# Patient Record
Sex: MALE | Race: WHITE | NOT HISPANIC OR LATINO | Employment: UNEMPLOYED | URBAN - METROPOLITAN AREA
[De-identification: names, ages, dates, MRNs, and addresses within clinical notes are randomized per-mention and may not be internally consistent; named-entity substitution may affect disease eponyms.]

---

## 2018-02-22 ENCOUNTER — OFFICE VISIT CONVERTED (OUTPATIENT)
Dept: ORTHOPEDIC SURGERY | Facility: CLINIC | Age: 38
End: 2018-02-22
Attending: PHYSICIAN ASSISTANT

## 2018-03-16 ENCOUNTER — OFFICE VISIT CONVERTED (OUTPATIENT)
Dept: ORTHOPEDIC SURGERY | Facility: CLINIC | Age: 38
End: 2018-03-16
Attending: PHYSICIAN ASSISTANT

## 2018-04-03 ENCOUNTER — OFFICE VISIT CONVERTED (OUTPATIENT)
Dept: ORTHOPEDIC SURGERY | Facility: CLINIC | Age: 38
End: 2018-04-03
Attending: ORTHOPAEDIC SURGERY

## 2018-05-01 ENCOUNTER — OFFICE VISIT CONVERTED (OUTPATIENT)
Dept: ORTHOPEDIC SURGERY | Facility: CLINIC | Age: 38
End: 2018-05-01
Attending: PHYSICIAN ASSISTANT

## 2018-06-12 ENCOUNTER — OFFICE VISIT CONVERTED (OUTPATIENT)
Dept: ORTHOPEDIC SURGERY | Facility: CLINIC | Age: 38
End: 2018-06-12
Attending: ORTHOPAEDIC SURGERY

## 2021-05-16 VITALS — RESPIRATION RATE: 18 BRPM | HEART RATE: 80 BPM | OXYGEN SATURATION: 99 %

## 2021-05-16 VITALS — HEART RATE: 72 BPM | RESPIRATION RATE: 16 BRPM | OXYGEN SATURATION: 98 %

## 2021-05-16 VITALS — HEART RATE: 82 BPM | OXYGEN SATURATION: 99 % | RESPIRATION RATE: 18 BRPM

## 2021-05-16 VITALS — OXYGEN SATURATION: 98 % | RESPIRATION RATE: 18 BRPM | HEART RATE: 80 BPM

## 2022-09-19 ENCOUNTER — HOSPITAL ENCOUNTER (EMERGENCY)
Facility: HOSPITAL | Age: 42
Discharge: HOME | End: 2022-09-19
Attending: EMERGENCY MEDICINE | Admitting: EMERGENCY MEDICINE

## 2022-09-19 ENCOUNTER — APPOINTMENT (OUTPATIENT)
Dept: GENERAL RADIOLOGY | Facility: HOSPITAL | Age: 42
End: 2022-09-19

## 2022-09-19 VITALS
RESPIRATION RATE: 18 BRPM | HEART RATE: 102 BPM | SYSTOLIC BLOOD PRESSURE: 144 MMHG | HEIGHT: 68 IN | TEMPERATURE: 98 F | WEIGHT: 225.97 LBS | BODY MASS INDEX: 34.25 KG/M2 | DIASTOLIC BLOOD PRESSURE: 104 MMHG | OXYGEN SATURATION: 99 %

## 2022-09-19 DIAGNOSIS — M79.601 RIGHT ARM PAIN: ICD-10-CM

## 2022-09-19 DIAGNOSIS — S46.211A STRAIN OF RIGHT BICEPS, INITIAL ENCOUNTER: Primary | ICD-10-CM

## 2022-09-19 PROCEDURE — 25010000002 KETOROLAC TROMETHAMINE PER 15 MG: Performed by: EMERGENCY MEDICINE

## 2022-09-19 PROCEDURE — 73030 X-RAY EXAM OF SHOULDER: CPT

## 2022-09-19 PROCEDURE — 99283 EMERGENCY DEPT VISIT LOW MDM: CPT

## 2022-09-19 PROCEDURE — 25010000002 DEXAMETHASONE SODIUM PHOSPHATE 10 MG/ML SOLUTION: Performed by: EMERGENCY MEDICINE

## 2022-09-19 PROCEDURE — 73060 X-RAY EXAM OF HUMERUS: CPT

## 2022-09-19 PROCEDURE — 96372 THER/PROPH/DIAG INJ SC/IM: CPT

## 2022-09-19 RX ORDER — DIFLUNISAL 500 MG/1
500 TABLET, FILM COATED ORAL EVERY 12 HOURS SCHEDULED
Qty: 30 TABLET | Refills: 0 | Status: SHIPPED | OUTPATIENT
Start: 2022-09-19

## 2022-09-19 RX ORDER — DEXAMETHASONE SODIUM PHOSPHATE 10 MG/ML
10 INJECTION, SOLUTION INTRAMUSCULAR; INTRAVENOUS ONCE
Status: COMPLETED | OUTPATIENT
Start: 2022-09-19 | End: 2022-09-19

## 2022-09-19 RX ORDER — HYDROCODONE BITARTRATE AND ACETAMINOPHEN 7.5; 325 MG/1; MG/1
1 TABLET ORAL ONCE
Status: COMPLETED | OUTPATIENT
Start: 2022-09-19 | End: 2022-09-19

## 2022-09-19 RX ORDER — KETOROLAC TROMETHAMINE 30 MG/ML
30 INJECTION, SOLUTION INTRAMUSCULAR; INTRAVENOUS ONCE
Status: COMPLETED | OUTPATIENT
Start: 2022-09-19 | End: 2022-09-19

## 2022-09-19 RX ORDER — CYCLOBENZAPRINE HCL 10 MG
10 TABLET ORAL 3 TIMES DAILY PRN
Qty: 20 TABLET | Refills: 0 | Status: SHIPPED | OUTPATIENT
Start: 2022-09-19

## 2022-09-19 RX ADMIN — DEXAMETHASONE SODIUM PHOSPHATE 10 MG: 10 INJECTION, SOLUTION INTRAMUSCULAR; INTRAVENOUS at 21:11

## 2022-09-19 RX ADMIN — HYDROCODONE BITARTRATE AND ACETAMINOPHEN 1 TABLET: 7.5; 325 TABLET ORAL at 21:57

## 2022-09-19 RX ADMIN — KETOROLAC TROMETHAMINE 30 MG: 30 INJECTION, SOLUTION INTRAMUSCULAR; INTRAVENOUS at 21:10

## 2022-09-20 NOTE — DISCHARGE INSTRUCTIONS
Please know that your x-ray completed today was negative for any fracture or dislocation.  Based on where your pain as it could be that you have a muscle tear or laceration.  To fully identify this you will need additional scanning such as an MRI.  This will need to be ordered by your primary care provider or by an orthopedic surgeon.  You have been provided a list for local primary care providers as well as the contact information for one of the orthopedic surgeons associated with the hospital.  Contact them and schedule an appointment if you continue to have pain and discomfort.  Take the medications prescribed you today as directed.  You may also adhere to the RICE acronym as outlined in your discharge instructions for additional comfort measures.  Return to the ER if you develop an increase in pain, become unable to use your right upper extremity, feel as if your right upper extremity is becoming cool to the touch, or if you have any other concern surrounding today's ER visit.

## 2022-09-20 NOTE — ED PROVIDER NOTES
"Time: 20:46 EDT  Arrived by: ***  Chief Complaint: ***  History provided by: ***  History is limited by: ***N/A    History of Present Illness:  Patient is a 41 y.o. year old male that presents to the emergency department with ***    HPI        Similar Symptoms Previously: ***  Recently seen: ***      Patient Care Team  Primary Care Provider: ***    Past Medical History:     Allergies   Allergen Reactions   • Morphine Hives     No past medical history on file.  No past surgical history on file.  No family history on file.    Home Medications:  Prior to Admission medications    Not on File        Social History:   PT  has no history on file for tobacco use, alcohol use, and drug use.    Record Review:  I have reviewed the patient's records in [place].     Review of Systems  Review of Systems     Physical Exam***  BP (!) 165/102   Pulse 101   Temp 98 °F (36.7 °C) (Oral)   Resp 18   Ht 172.7 cm (68\")   Wt 102 kg (225 lb 15.5 oz)   SpO2 100%   BMI 34.36 kg/m²     Physical Exam             ED Course  BP (!) 165/102   Pulse 101   Temp 98 °F (36.7 °C) (Oral)   Resp 18   Ht 172.7 cm (68\")   Wt 102 kg (225 lb 15.5 oz)   SpO2 100%   BMI 34.36 kg/m²   No results found for this or any previous visit.  Medications - No data to display  XR Shoulder 2+ View Right    Result Date: 9/19/2022  Narrative: PROCEDURE: XR SHOULDER 2+ VW RIGHT  COMPARISON: None  INDICATIONS: FALL TODAY, RIGHT SHOULDER PAIN  FINDINGS:  No fracture or malalignment is identified.  Mild glenohumeral osteoarthritis is noted.  Soft tissues appear normal.      Impression:   1. No acute fracture or malalignment 2. Mild glenohumeral osteoarthritis      Brandon Morris M.D.       Electronically Signed and Approved By: Brandon Morris M.D. on 9/19/2022 at 20:37             XR Humerus Right    Result Date: 9/19/2022  Narrative: PROCEDURE: XR HUMERUS RIGHT  COMPARISON: None  INDICATIONS: RIGHT HUMERUS PAIN, FALL TODAY  FINDINGS:  No fracture or malalignment is " identified.  Soft tissues appear normal.      Impression:   1. No acute fracture or malalignment.       Brandon Morris M.D.       Electronically Signed and Approved By: Brandon Morris M.D. on 9/19/2022 at 20:38               Procedures/EKGs:  Procedures    EKG:    Rhythm: ***  Rate: ***  Axis: ***  Intervals: ***  ST Segment: ***    EKG Comparison: ***    Interpreted by me        Medical Decision Making:                     MDM     Final diagnoses:   None        Disposition:  ED Disposition     None

## 2022-09-20 NOTE — ED PROVIDER NOTES
"Room number: 58/58    Chief Complaint:     Time: 8:50 PM EDT  Arrived by: LORETTA  History provided by:   History is limited by: N/A     History of Present Illness:  Patient is a 41 y.o. year old male that presents to the emergency department with right arm pain in the biceps area.  Patient fell off a his trailer today after slipping on debris and landed on his right arm.  He denies striking his head at time of fall and denies any dizziness or lightheadedness prior to falling stating he simply slipped on debris and fell.  He rates his pain as a 10 on a scale of 0-10.    HPI    Similar Symptoms Previously: No  Recently seen: No      Patient Care Team  Primary Care Provider: Provider, No Known    Past Medical History:   Allergies   Allergen Reactions   • Morphine Hives     History reviewed. No pertinent past medical history.  History reviewed. No pertinent surgical history.  History reviewed. No pertinent family history.    Home Medications:  Prior to Admission medications    Not on File        Social History:        Review of Systems  Review of Systems   Constitutional: Negative for chills and fever.   HENT: Negative for congestion, ear pain and sore throat.    Eyes: Negative for pain.   Respiratory: Negative for cough, chest tightness and shortness of breath.    Cardiovascular: Negative for chest pain.   Gastrointestinal: Negative for abdominal pain, diarrhea, nausea and vomiting.   Genitourinary: Negative for flank pain and hematuria.   Musculoskeletal: Negative for joint swelling.        Right arm pain to biceps area   Skin: Negative for pallor.   Neurological: Negative for seizures and headaches.   All other systems reviewed and are negative.       Physical Exam:   BP (!) 144/104 (BP Location: Left arm, Patient Position: Sitting)   Pulse 102   Temp 98 °F (36.7 °C) (Oral)   Resp 18   Ht 172.7 cm (68\")   Wt 102 kg (225 lb 15.5 oz)   SpO2 99%   BMI 34.36 kg/m²     Physical Exam  Vitals and nursing note reviewed. "   Constitutional:       General: He is not in acute distress.     Appearance: Normal appearance. He is not toxic-appearing.   HENT:      Head: Normocephalic and atraumatic.      Mouth/Throat:      Mouth: Mucous membranes are moist.   Eyes:      General: No scleral icterus.  Cardiovascular:      Rate and Rhythm: Normal rate and regular rhythm.      Pulses: Normal pulses.      Heart sounds: Normal heart sounds.   Pulmonary:      Effort: Pulmonary effort is normal. No respiratory distress.      Breath sounds: Normal breath sounds.   Abdominal:      General: Abdomen is flat.      Palpations: Abdomen is soft.      Tenderness: There is no abdominal tenderness.   Musculoskeletal:         General: Tenderness present. No swelling, deformity or signs of injury. Normal range of motion.        Arms:       Cervical back: Normal range of motion and neck supple.   Skin:     General: Skin is warm and dry.      Coloration: Skin is not jaundiced or pale.      Findings: No bruising, erythema, lesion or rash.   Neurological:      Mental Status: He is alert and oriented to person, place, and time. Mental status is at baseline.      Cranial Nerves: No cranial nerve deficit.      Sensory: No sensory deficit.                Medications in the Emergency Department:  Medications   ketorolac (TORADOL) injection 30 mg (30 mg Intramuscular Given 9/19/22 2110)   dexamethasone sodium phosphate injection 10 mg (10 mg Intramuscular Given 9/19/22 2111)   HYDROcodone-acetaminophen (NORCO) 7.5-325 MG per tablet 1 tablet (1 tablet Oral Given 9/19/22 2157)        Labs  Lab Results (last 24 hours)     ** No results found for the last 24 hours. **           Imaging:  No Radiology Exams Resulted Within Past 24 Hours    Procedures:  Procedures    Progress  ED Course as of 09/20/22 2322   Mon Sep 19, 2022   2048 XR Humerus Right  negative [DS]   2048 XR Shoulder 2+ View Right  Negative other than arthritis [DS]      ED Course User Index  [DS] Lyssa Marte,  APRN                            Medical Decision Making:  MDM  Number of Diagnoses or Management Options  Right arm pain: new and does not require workup  Strain of right biceps, initial encounter: new and does not require workup  Diagnosis management comments: I have spoke with the patient and I have explained the patient´s condition, diagnoses and treatment plan based on the information available to me at this time. I have answered all questions and addressed any concerns. The patient has a good understanding of the patient´s diagnosis, condition, and treatment plan as can be expected at this point. The vital signs have been stable. The patient´s condition is stable and appropriate for discharge from the emergency department.      The patient will pursue further outpatient evaluation with the primary care physician or other designated or consulting physician as outlined in the discharge instructions. They are agreeable to this plan of care and follow-up instructions have been explained in detail. The patient has received these instructions in written format and have expressed an understanding of the discharge instructions. The patient is aware that any significant change in condition or worsening of symptoms should prompt an immediate return to this or the closest emergency department or call to 911.       Amount and/or Complexity of Data Reviewed  Tests in the radiology section of CPT®: reviewed and ordered  Review and summarize past medical records: yes (I have personally reviewed patient's previous medical encounters.)    Risk of Complications, Morbidity, and/or Mortality  Presenting problems: low  Diagnostic procedures: low  Management options: low    Patient Progress  Patient progress: stable       Final diagnoses:   Strain of right biceps, initial encounter   Right arm pain        Disposition:  ED Disposition     ED Disposition   Discharge    Condition   Stable    Comment   --             Prescriptions:        Medication List      START taking these medications    cyclobenzaprine 10 MG tablet  Commonly known as: FLEXERIL  Take 1 tablet by mouth 3 (Three) Times a Day As Needed for Muscle Spasms.     diflunisal 500 MG tablet tablet  Commonly known as: DOLOBID  Take 1 tablet by mouth Every 12 (Twelve) Hours.           Where to Get Your Medications      These medications were sent to [id number] - [place], [place] - [address] - [phone] [facility] [phone] [facility] [address] [place] [address]    Phone: [phone]   · cyclobenzaprine 10 MG tablet  · diflunisal 500 MG tablet tablet         This medical record created using voice recognition software and may contain unintended errors.     Mishel Reyes, APRN  09/20/22 2971

## 2023-03-03 ENCOUNTER — HOSPITAL ENCOUNTER (EMERGENCY)
Facility: HOSPITAL | Age: 43
Discharge: HOME | End: 2023-03-03
Attending: STUDENT IN AN ORGANIZED HEALTH CARE EDUCATION/TRAINING PROGRAM | Admitting: STUDENT IN AN ORGANIZED HEALTH CARE EDUCATION/TRAINING PROGRAM
Payer: COMMERCIAL

## 2023-03-03 VITALS
SYSTOLIC BLOOD PRESSURE: 164 MMHG | HEART RATE: 103 BPM | BODY MASS INDEX: 32.85 KG/M2 | WEIGHT: 221.78 LBS | HEIGHT: 69 IN | RESPIRATION RATE: 26 BRPM | OXYGEN SATURATION: 100 % | DIASTOLIC BLOOD PRESSURE: 106 MMHG | TEMPERATURE: 98.3 F

## 2023-03-03 DIAGNOSIS — S05.01XA ABRASION OF RIGHT CORNEA, INITIAL ENCOUNTER: Primary | ICD-10-CM

## 2023-03-03 PROCEDURE — 99283 EMERGENCY DEPT VISIT LOW MDM: CPT

## 2023-03-03 RX ORDER — ERYTHROMYCIN 5 MG/G
OINTMENT OPHTHALMIC EVERY 6 HOURS
Qty: 3.5 G | Refills: 0 | Status: SHIPPED | OUTPATIENT
Start: 2023-03-03 | End: 2023-03-08

## 2023-03-03 RX ORDER — PROPARACAINE HYDROCHLORIDE 5 MG/ML
2 SOLUTION/ DROPS OPHTHALMIC ONCE
Status: COMPLETED | OUTPATIENT
Start: 2023-03-03 | End: 2023-03-03

## 2023-03-03 RX ORDER — ERYTHROMYCIN 5 MG/G
1 OINTMENT OPHTHALMIC ONCE
Status: COMPLETED | OUTPATIENT
Start: 2023-03-03 | End: 2023-03-03

## 2023-03-03 RX ADMIN — ERYTHROMYCIN 1 APPLICATION: 5 OINTMENT OPHTHALMIC at 09:51

## 2023-03-03 RX ADMIN — PROPARACAINE HYDROCHLORIDE 2 DROP: 5 SOLUTION/ DROPS OPHTHALMIC at 09:27

## 2023-03-03 NOTE — ED PROVIDER NOTES
Time: 9:01 AM EST  Date of encounter:  3/3/2023  Independent Historian/Clinical History and Information was obtained by:   Patient  Chief Complaint: eye trauma     History is limited by: N/A    History of Present Illness:  Patient is a 42 y.o. year old male who presents to the emergency department for evaluation of eye trauma. Pt notes he fell while getting out of bed this morning and injured his R eye. Pt notes hitting his eye on the rail next to his bed frame. Pt notes eye pain this morning. Pt denies alcohol and drug use. Pt denies wearing glasses or contacts. Pt notes improvement of eye pain currently.       History provided by:  Patient   used: No        Patient Care Team  Primary Care Provider: Provider, No Known    Past Medical History:     Allergies   Allergen Reactions   • Morphine Hives     No past medical history on file.  No past surgical history on file.  No family history on file.    Home Medications:  Prior to Admission medications    Medication Sig Start Date End Date Taking? Authorizing Provider   cyclobenzaprine (FLEXERIL) 10 MG tablet Take 1 tablet by mouth 3 (Three) Times a Day As Needed for Muscle Spasms. 9/19/22   Mishel Reyes APRN   diflunisal (DOLOBID) 500 MG tablet tablet Take 1 tablet by mouth Every 12 (Twelve) Hours. 9/19/22   Mishel Reyes APRN        Social History:          Review of Systems:  Review of Systems   Constitutional: Negative for chills and fever.   HENT: Negative for congestion, rhinorrhea and sore throat.    Eyes: Positive for pain and visual disturbance.   Respiratory: Negative for apnea, cough, chest tightness and shortness of breath.    Cardiovascular: Negative for chest pain and palpitations.   Gastrointestinal: Negative for abdominal pain, diarrhea, nausea and vomiting.   Genitourinary: Negative for difficulty urinating and dysuria.   Musculoskeletal: Negative for joint swelling and myalgias.   Skin: Negative for color change.  "  Neurological: Negative for seizures and headaches.   Psychiatric/Behavioral: Negative.    All other systems reviewed and are negative.       Physical Exam:  BP (!) 164/106 (BP Location: Left arm, Patient Position: Lying)   Pulse 103   Temp 98.3 °F (36.8 °C) (Oral)   Resp 26   Ht 175.3 cm (69\")   Wt 101 kg (221 lb 12.5 oz)   SpO2 100%   BMI 32.75 kg/m²     Physical Exam  Vitals and nursing note reviewed.   Constitutional:       General: He is not in acute distress.     Appearance: Normal appearance. He is not toxic-appearing.   HENT:      Head: Normocephalic and atraumatic.      Jaw: There is normal jaw occlusion.   Eyes:      General: Lids are normal.      Extraocular Movements: Extraocular movements intact.      Conjunctiva/sclera: Conjunctivae normal.      Pupils: Pupils are equal, round, and reactive to light.      Comments: R eye injected   Corneal abrasion   Cardiovascular:      Rate and Rhythm: Normal rate and regular rhythm.      Pulses: Normal pulses.      Heart sounds: Normal heart sounds.   Pulmonary:      Effort: Pulmonary effort is normal. No respiratory distress.      Breath sounds: Normal breath sounds. No wheezing or rhonchi.   Abdominal:      General: Abdomen is flat.      Palpations: Abdomen is soft.      Tenderness: There is no abdominal tenderness. There is no guarding or rebound.   Musculoskeletal:         General: Normal range of motion.      Cervical back: Normal range of motion and neck supple.      Right lower leg: No edema.      Left lower leg: No edema.   Skin:     General: Skin is warm and dry.   Neurological:      Mental Status: He is alert and oriented to person, place, and time. Mental status is at baseline.   Psychiatric:         Mood and Affect: Mood normal.                  Procedures:  Procedures      Medical Decision Making:      Comorbidities that affect care:    None        The following orders were placed and all results were independently analyzed by me:  Orders Placed " This Encounter   Procedures   • Supplies To Bedside - Notify MD When Ready- Fluorescein, Victorino Pen       Medications Given in the Emergency Department:  Medications   proparacaine (ALCAINE) 0.5 % ophthalmic solution 2 drop (2 drops Both Eyes Given 3/3/23 0927)   erythromycin (ROMYCIN) ophthalmic ointment 1 application (1 application Right Eye Given 3/3/23 0944)        ED Course:         Labs:    Lab Results (last 24 hours)     ** No results found for the last 24 hours. **           Imaging:    No Radiology Exams Resulted Within Past 24 Hours      Differential Diagnosis and Discussion:    Eye Pain/Blurred Vision: Differential diagnosis includes but is not limited to dacryocystitis, hordeolum, chalazion, periorbital cellulitis, cavernous sinus thrombosis, blepharitis, and glaucoma.      MDM     Patient's right eye is injected.  Injury occurred this morning.  There is a corneal abrasion present.  No sign of globe rupture pupil equal round reactive to light.  Visual acuity could not be obtained because we attempted multiple times to have the patient participate, but he did refused to participate in the visual acuity.    Social Determinants of Health:    Patient is independent, reliable, and has access to care.       Disposition and Care Coordination:    Discharged: I considered escalation of care by admitting this patient for observation, however the patient has improved and is suitable and  stable for discharge.    I have explained discharge medications and the need for follow up with the patient/caretakers. This was also printed in the discharge instructions. Patient was discharged with the following medications and follow up:      Medication List      New Prescriptions    erythromycin 5 MG/GM ophthalmic ointment  Commonly known as: ROMYCIN  Administer  to the right eye Every 6 (Six) Hours for 5 days.           Where to Get Your Medications      These medications were sent to [id number],  [place] - [address] - [phone]  - [phone]  [address], [facility] [address]    Phone: [phone]   · erythromycin 5 MG/GM ophthalmic ointment      [facility],   [address]  [address]  [facility] [address] [phone]      Please follow-up with ophthalmology       Final diagnoses:   Abrasion of right cornea, initial encounter        ED Disposition     ED Disposition   Discharge    Condition   Stable    Comment   --             This medical record created using voice recognition software.        Documentation assistance provided by Kevin franco, acting as scribe for Laura Jean MD. Information recorded by the scribe was done at my direction and has been verified and validated by me.       Kevin Franco  03/03/23 0939       Laura Jean MD  03/03/23 1012